# Patient Record
Sex: MALE
[De-identification: names, ages, dates, MRNs, and addresses within clinical notes are randomized per-mention and may not be internally consistent; named-entity substitution may affect disease eponyms.]

---

## 2019-07-25 PROBLEM — Z00.00 ENCOUNTER FOR PREVENTIVE HEALTH EXAMINATION: Status: ACTIVE | Noted: 2019-07-25

## 2019-07-31 ENCOUNTER — APPOINTMENT (OUTPATIENT)
Dept: DERMATOLOGY | Facility: CLINIC | Age: 74
End: 2019-07-31
Payer: MEDICARE

## 2019-07-31 VITALS — WEIGHT: 175 LBS | HEIGHT: 75 IN | BODY MASS INDEX: 21.76 KG/M2

## 2019-07-31 DIAGNOSIS — L81.4 OTHER MELANIN HYPERPIGMENTATION: ICD-10-CM

## 2019-07-31 DIAGNOSIS — Z78.9 OTHER SPECIFIED HEALTH STATUS: ICD-10-CM

## 2019-07-31 DIAGNOSIS — L57.0 ACTINIC KERATOSIS: ICD-10-CM

## 2019-07-31 DIAGNOSIS — L82.1 OTHER SEBORRHEIC KERATOSIS: ICD-10-CM

## 2019-07-31 PROCEDURE — 17000 DESTRUCT PREMALG LESION: CPT

## 2019-07-31 PROCEDURE — 99213 OFFICE O/P EST LOW 20 MIN: CPT | Mod: 25

## 2019-07-31 RX ORDER — ATORVASTATIN CALCIUM 20 MG/1
20 TABLET, FILM COATED ORAL DAILY
Refills: 0 | Status: ACTIVE | COMMUNITY

## 2019-07-31 RX ORDER — TICAGRELOR 90 MG/1
90 TABLET ORAL
Refills: 0 | Status: ACTIVE | COMMUNITY

## 2019-07-31 RX ORDER — EMPAGLIFLOZIN 25 MG/1
25 TABLET, FILM COATED ORAL
Refills: 0 | Status: ACTIVE | COMMUNITY

## 2019-07-31 RX ORDER — METOPROLOL TARTRATE 25 MG/1
25 TABLET, FILM COATED ORAL DAILY
Refills: 0 | Status: ACTIVE | COMMUNITY

## 2019-07-31 RX ORDER — HYDROCORTISONE 25 MG/G
2.5 OINTMENT TOPICAL
Qty: 1 | Refills: 0 | Status: ACTIVE | COMMUNITY
Start: 2019-07-31 | End: 1900-01-01

## 2019-07-31 RX ORDER — LISINOPRIL 30 MG/1
TABLET ORAL DAILY
Refills: 0 | Status: ACTIVE | COMMUNITY

## 2019-07-31 RX ORDER — ASPIRIN 81 MG
81 TABLET, DELAYED RELEASE (ENTERIC COATED) ORAL
Refills: 0 | Status: ACTIVE | COMMUNITY

## 2019-07-31 NOTE — ASSESSMENT
[FreeTextEntry1] : ? 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

## 2019-07-31 NOTE — HISTORY OF PRESENT ILLNESS
[FreeTextEntry1] : Evaluation of growths [de-identified] : Followup visit for 73-year-old white male for evaluation of growths. Particularly concerned about a lesion on the anterior scalp. No history of skin cancer.\par Patient also complains of a tender lesion in the upper intergluteal cleft area for 5 weeks. Spends significant time in a recliner.

## 2019-07-31 NOTE — PHYSICAL EXAM
[Alert] : alert [Oriented x 3] : ~L oriented x 3 [Well Nourished] : well nourished [FreeTextEntry3] : The following areas were examined and no significant abnormalities were seen except as noted below:\par \par Type I skin\par \par scalp, face, eyelids, nose, lips, ears, neck, chest, abdomen, back,groin, buttocks, right arm, left arm, right hand, left hand,\par right  leg, left leg, right foot, left foot\par \par Anterior forehead on left: 4 x 4 mm hyperkeratotic pink papule\par Face:  moderate small pink scaly macules-especially on forehead\par Multiple small and large brown smooth patches present diffusely-especially on the arms and trunk\par Right upper mid back: Full by 2 mm brown macule with a surrounding vascular blush-not suspicious on dermoscopy\par Back: A few small brown verrucous plaques\par Upper intergluteal cleft: 5 x 5 mm slightly ulcerated hard white scaly plaque\par \par No other suspicious lesions seen\par

## 2019-08-14 ENCOUNTER — APPOINTMENT (OUTPATIENT)
Dept: DERMATOLOGY | Facility: CLINIC | Age: 74
End: 2019-08-14
Payer: MEDICARE

## 2019-08-14 PROCEDURE — 99213 OFFICE O/P EST LOW 20 MIN: CPT

## 2019-08-14 RX ORDER — TRIAMCINOLONE ACETONIDE 1 MG/G
0.1 OINTMENT TOPICAL
Qty: 1 | Refills: 1 | Status: ACTIVE | COMMUNITY
Start: 2019-08-14 | End: 1900-01-01

## 2019-08-14 NOTE — PHYSICAL EXAM
[Alert] : alert [Oriented x 3] : ~L oriented x 3 [Face] : Face [Well Nourished] : well nourished [Nose] : Nose [Eyelids] : Eyelids [Ears] : Ears [Lips] : Lips [FreeTextEntry3] : Upper intragluteal cleft: Mostly smooth pink patch with focal scaling superiorly and slightly elevated on the southwest portion\par No ulcerations seen

## 2019-08-14 NOTE — HISTORY OF PRESENT ILLNESS
[FreeTextEntry1] : Sore on buttocks [de-identified] : Followup visit for 73-year-old white male first seen by me on July 31, 2019, with a tender 5 x 5 mm slightly ulcerated hard white scaly plaque in the upper intergluteal cleft. This was diagnosed as lichen simplex chronicus and treated with 2% hydrocortisone ointment.  Pain persists but is less.

## 2019-10-17 ENCOUNTER — APPOINTMENT (OUTPATIENT)
Dept: DERMATOLOGY | Facility: CLINIC | Age: 74
End: 2019-10-17
Payer: MEDICARE

## 2019-10-17 PROCEDURE — 99213 OFFICE O/P EST LOW 20 MIN: CPT

## 2019-10-17 RX ORDER — LIDOCAINE 5 G/100G
5 OINTMENT TOPICAL
Qty: 1 | Refills: 2 | Status: ACTIVE | COMMUNITY
Start: 2019-10-17 | End: 1900-01-01

## 2019-10-17 NOTE — PHYSICAL EXAM
[FreeTextEntry3] : Upper and gluteal cleft: 13 x 5 mm clean ulcer present with mild maceration present towards the Southeast border

## 2019-10-17 NOTE — HISTORY OF PRESENT ILLNESS
[FreeTextEntry1] : Tender rash on buttocks [de-identified] : Followup visit for 74-year-old white male last seen by me on August 14, 2019, with a tender plaque in the upper intergluteal cleft.  Treatment at that time was changed from to 2 1/2% hydrocortisone ointment to triamcinolone ointment 0.1%.  Rash remains "fairly uncomfortable".

## 2019-11-20 ENCOUNTER — APPOINTMENT (OUTPATIENT)
Dept: DERMATOLOGY | Facility: CLINIC | Age: 74
End: 2019-11-20
Payer: MEDICARE

## 2019-11-20 PROCEDURE — 99213 OFFICE O/P EST LOW 20 MIN: CPT

## 2019-11-20 NOTE — HISTORY OF PRESENT ILLNESS
[FreeTextEntry1] : Sacral ulcer [de-identified] : Followup visit for 74-year-old white male last seen by me on October 17, 2002 with a 13 x 5 mm clean ulcer with mild maceration on the upper intergluteal cleft.\par Lesion originally appeared as a 5 x 5 mm slightly ulcerated heart white scaly plaque on his first visit of July 31, 2019.\par Treatment was changed to 5% lidocaine ointment at the last visit.  Also using mupirocin ointment.

## 2019-11-20 NOTE — PHYSICAL EXAM
[Face] : Face [Nose] : Nose [Eyelids] : Eyelids [Ears] : Ears [Lips] : Lips [FreeTextEntry3] : Upper intragluteal cleft: 10 x 3 mm oval-shaped clean ulcer with maceration present on the east and southeast edges

## 2019-12-19 ENCOUNTER — APPOINTMENT (OUTPATIENT)
Dept: DERMATOLOGY | Facility: CLINIC | Age: 74
End: 2019-12-19
Payer: MEDICARE

## 2019-12-19 PROCEDURE — 99213 OFFICE O/P EST LOW 20 MIN: CPT

## 2019-12-19 NOTE — PHYSICAL EXAM
[Alert] : alert [Oriented x 3] : ~L oriented x 3 [Well Nourished] : well nourished [Face] : Face [Nose] : Nose [Eyelids] : Eyelids [Ears] : Ears [Lips] : Lips [FreeTextEntry3] : Upper inter gluteal cleft: 5 x 1 mm vertical hard white crust present (lesion was a 10 x 3 mm oval-shaped clean ulcer with maceration at the last visit)

## 2019-12-19 NOTE — HISTORY OF PRESENT ILLNESS
[FreeTextEntry1] : Sacral ulcer [de-identified] : Followup visit for 74-year-old white male last seen by me on November 20, 2019, with a several month history of a tender ulcer in the upper intergluteal cleft. Currently on new piercing ointment t.i.d. in 5% lidocaine ointment p.r.n. pain. Also continues to improve. Pain is minimal.

## 2020-02-05 ENCOUNTER — APPOINTMENT (OUTPATIENT)
Dept: DERMATOLOGY | Facility: CLINIC | Age: 75
End: 2020-02-05
Payer: MEDICARE

## 2020-02-05 DIAGNOSIS — L98.429 NON-PRESSURE CHRONIC ULCER OF BACK WITH UNSPECIFIED SEVERITY: ICD-10-CM

## 2020-02-05 PROCEDURE — 99213 OFFICE O/P EST LOW 20 MIN: CPT

## 2020-02-05 NOTE — HISTORY OF PRESENT ILLNESS
[FreeTextEntry1] : Sore on buttocks [de-identified] : Followup visit for this 74-year-old white male last seen by me on December 19, 2019, with a several month history of a tender ulcer on the upper intergluteal cleft. Had been treated with mupirocin ointment and 5% lidocaine ointment p.r.n. pain.  At the last visit, patient was noted to have a 5x1 mm vertical hard white crust (at the prior visit the lesion measured 10 x 3 mm and was an oval shape clean ulcer with maceration).\par Patient continues to have discomfort but has not needed to use the lidocaine ointment.

## 2020-02-05 NOTE — PHYSICAL EXAM
[Face] : Face [Nose] : Nose [Eyelids] : Eyelids [Ears] : Ears [Lips] : Lips [FreeTextEntry3] : Upper intergluteal cleft on left: 5 x 2 mm hard white scaly papule

## 2020-03-12 ENCOUNTER — APPOINTMENT (OUTPATIENT)
Dept: DERMATOLOGY | Facility: CLINIC | Age: 75
End: 2020-03-12
Payer: MEDICARE

## 2020-03-12 DIAGNOSIS — L28.0 LICHEN SIMPLEX CHRONICUS: ICD-10-CM

## 2020-03-12 DIAGNOSIS — R20.8 OTHER DISTURBANCES OF SKIN SENSATION: ICD-10-CM

## 2020-03-12 PROCEDURE — 99213 OFFICE O/P EST LOW 20 MIN: CPT

## 2020-03-12 NOTE — HISTORY OF PRESENT ILLNESS
[FreeTextEntry1] : Perirectal lesion [de-identified] : Followup visit for 74-year-old white male last seen by me on February 5, 2020, with a several month history of a tender ulcer on the upper intergluteal cleft. Had previously been treated with mupirocin ointment and 5% lidocaine ointment.  At the last visit, no ulcer was present but a 5 x 2 mm hard white scaly papule was present in the upper intergluteal cleft on the left. This area was gently debrided with a curet and three quarters of this hard white scale was removed. No underlying ulcer seen. Patient advised to continue using the mupirocin or 5% lidocaine ointment 2-3 times a day.\par Still has tenderness in the region.

## 2020-03-12 NOTE — ASSESSMENT
[FreeTextEntry1] : History of lichen simplex chronicus-like lesion-currently resolved with continued symptomatology

## 2020-03-12 NOTE — PHYSICAL EXAM
[Face] : Face [Nose] : Nose [Eyelids] : Eyelids [Ears] : Ears [Lips] : Lips [FreeTextEntry3] : Left upper intergluteal cleft area: No lesion seen or palpated

## 2020-07-07 ENCOUNTER — APPOINTMENT (OUTPATIENT)
Dept: DERMATOLOGY | Facility: CLINIC | Age: 75
End: 2020-07-07